# Patient Record
Sex: MALE | Race: WHITE | NOT HISPANIC OR LATINO | Employment: FULL TIME | URBAN - METROPOLITAN AREA
[De-identification: names, ages, dates, MRNs, and addresses within clinical notes are randomized per-mention and may not be internally consistent; named-entity substitution may affect disease eponyms.]

---

## 2022-06-01 ENCOUNTER — OFFICE VISIT (OUTPATIENT)
Dept: URGENT CARE | Facility: URGENT CARE | Age: 37
End: 2022-06-01
Payer: COMMERCIAL

## 2022-06-01 VITALS
TEMPERATURE: 97.9 F | SYSTOLIC BLOOD PRESSURE: 126 MMHG | RESPIRATION RATE: 18 BRPM | OXYGEN SATURATION: 99 % | WEIGHT: 215 LBS | HEART RATE: 84 BPM | DIASTOLIC BLOOD PRESSURE: 82 MMHG
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
## 2022-06-01 DIAGNOSIS — S61.310A LACERATION OF RIGHT INDEX FINGER WITHOUT FOREIGN BODY WITH DAMAGE TO NAIL, INITIAL ENCOUNTER: ICD-10-CM

## 2022-06-01 DIAGNOSIS — S61.258A DOG BITE OF INDEX FINGER, INITIAL ENCOUNTER: Primary | ICD-10-CM

## 2022-06-01 PROCEDURE — 90471 IMMUNIZATION ADMIN: CPT | Performed by: PHYSICIAN ASSISTANT

## 2022-06-01 PROCEDURE — 99203 OFFICE O/P NEW LOW 30 MIN: CPT | Mod: 25 | Performed by: PHYSICIAN ASSISTANT

## 2022-06-01 PROCEDURE — 90715 TDAP VACCINE 7 YRS/> IM: CPT | Performed by: PHYSICIAN ASSISTANT

## 2022-06-01 NOTE — PATIENT INSTRUCTIONS
Monitor for evidence of local infection, if redness expands after 48 hours of antibiotics, the medicine is not appropriate for the infection    Scarring will occur with this kind of injury.  The lateral fold of your nail will not return to its previous normal    You must confirm rabies vaccination of the dog, and monitor for 5 days for abnormal behavior or death.

## 2022-06-02 NOTE — PROGRESS NOTES
Prior to immunization administration, verified patients identity using patient s name and date of birth. Please see Immunization Activity for additional information.     Screening Questionnaire for Adult Immunization    Are you sick today?   No   Do you have allergies to medications, food, a vaccine component or latex?   No   Have you ever had a serious reaction after receiving a vaccination?   No   Do you have a long-term health problem with heart, lung, kidney, or metabolic disease (e.g., diabetes), asthma, a blood disorder, no spleen, complement component deficiency, a cochlear implant, or a spinal fluid leak?  Are you on long-term aspirin therapy?   No   Do you have cancer, leukemia, HIV/AIDS, or any other immune system problem?   No   Do you have a parent, brother, or sister with an immune system problem?   No   In the past 3 months, have you taken medications that affect  your immune system, such as prednisone, other steroids, or anticancer drugs; drugs for the treatment of rheumatoid arthritis, Crohn s disease, or psoriasis; or have you had radiation treatments?   No   Have you had a seizure, or a brain or other nervous system problem?   No   During the past year, have you received a transfusion of blood or blood    products, or been given immune (gamma) globulin or antiviral drug?   No   For women: Are you pregnant or is there a chance you could become       pregnant during the next month?   No   Have you received any vaccinations in the past 4 weeks?   No     Immunization questionnaire answers were all negative.        Per orders of Shaheen GARCIA, injection of TDAP given by Richy Mc CMA. Patient instructed to remain in clinic for 15 minutes afterwards, and to report any adverse reaction to me immediately.       Screening performed by Richy Mc CMA on 6/1/2022 at 8:19 PM.

## 2022-06-07 NOTE — PROGRESS NOTES
SUBJECTIVE:  Domo Simms is a 37 year old male who presents with a chief complaint of an animal bite on the fingers.  He was bitten by a dog just prior to arrival.   Cicumstances of bite: unprovoked attack.  Severity: moderate.  Animal's immunizations up to date per owner   Associated symptoms: deformity was immediately noted, drainage from wound site, swelling and edema at site    Td vaccination indicated and given today    No past medical history on file.    Current Outpatient Medications:      amoxicillin-clavulanate (AUGMENTIN) 875-125 MG tablet, Take 1 tablet by mouth 2 times daily for 7 days, Disp: 14 tablet, Rfl: 0     amoxicillin-clavulanate (AUGMENTIN) 875-125 MG tablet, Take 1 tablet by mouth 2 times daily for 7 days, Disp: 14 tablet, Rfl: 0  Social History     Tobacco Use     Smoking status: Never Smoker     Smokeless tobacco: Never Used   Substance Use Topics     Alcohol use: Not on file       ROS:  Review of systems negative except as stated above.    OBJECTIVE:  /82   Pulse 84   Temp 97.9  F (36.6  C)   Resp 18   Wt 97.5 kg (215 lb)   SpO2 99%   GENERAL: healthy, alert no acute distress  SKIN: obliteration of lateral fold right index finger  RESP: lungs clear to auscultation - no rales, rhonchi or wheezes  CV: regular rates and rhythm, normal S1 S2, no murmer noted  MS:extremities normal- no gross deformities noted,  FROM noted in all extremities  NEURO: Normal strength and tone, sensory exam grossly normal,  normal speech and mentation    ASSESSMENT:  (S61.258A,  W54.0XXA) Dog bite of index finger, initial encounter  (primary encounter diagnosis)  Plan: TDAP VACCINE (Adacel, Boostrix)  [[id number]],         amoxicillin-clavulanate (AUGMENTIN) 875-125 MG         tablet, amoxicillin-clavulanate (AUGMENTIN)         875-125 MG tablet, INJECTION INTRAMUSCULAR OR         SUB-Q      (S61.310A) Laceration of right index finger without foreign body with damage to nail, initial encounter  Plan:  TDAP VACCINE (Adacel, Boostrix)  [[id number]],         amoxicillin-clavulanate (AUGMENTIN) 875-125 MG         tablet, INJECTION INTRAMUSCULAR OR SUB-Q      Patient Instructions   Monitor for evidence of local infection, if redness expands after 48 hours of antibiotics, the medicine is not appropriate for the infection    Scarring will occur with this kind of injury.  The lateral fold of your nail will not return to its previous normal    You must confirm rabies vaccination of the dog, and monitor for 5 days for abnormal behavior or death.

## 2022-08-21 ENCOUNTER — HEALTH MAINTENANCE LETTER (OUTPATIENT)
Age: 37
End: 2022-08-21

## 2022-11-21 ENCOUNTER — HEALTH MAINTENANCE LETTER (OUTPATIENT)
Age: 37
End: 2022-11-21

## 2023-02-01 ENCOUNTER — APPOINTMENT (OUTPATIENT)
Dept: CT IMAGING | Facility: CLINIC | Age: 38
DRG: 101 | End: 2023-02-01
Attending: EMERGENCY MEDICINE
Payer: COMMERCIAL

## 2023-02-01 ENCOUNTER — HOSPITAL ENCOUNTER (INPATIENT)
Facility: CLINIC | Age: 38
LOS: 3 days | Discharge: HOME | DRG: 101 | End: 2023-02-04
Attending: EMERGENCY MEDICINE | Admitting: INTERNAL MEDICINE
Payer: COMMERCIAL

## 2023-02-01 DIAGNOSIS — N17.9 ACUTE KIDNEY INJURY (H): ICD-10-CM

## 2023-02-01 DIAGNOSIS — G40.909 RECURRENT SEIZURES (H): ICD-10-CM

## 2023-02-01 LAB
ALBUMIN SERPL BCG-MCNC: 5 G/DL (ref 3.5–5.2)
ALBUMIN UR-MCNC: NEGATIVE MG/DL
ALP SERPL-CCNC: 86 U/L (ref 40–129)
ALT SERPL W P-5'-P-CCNC: 28 U/L (ref 10–50)
AMPHETAMINES UR QL SCN: ABNORMAL
ANION GAP SERPL CALCULATED.3IONS-SCNC: 38 MMOL/L (ref 7–15)
APPEARANCE UR: CLEAR
AST SERPL W P-5'-P-CCNC: 27 U/L (ref 10–50)
ATRIAL RATE - MUSE: 91 BPM
BARBITURATES UR QL SCN: ABNORMAL
BASOPHILS # BLD MANUAL: 0.2 10E3/UL (ref 0–0.2)
BASOPHILS NFR BLD MANUAL: 1 %
BENZODIAZ UR QL SCN: ABNORMAL
BILIRUB SERPL-MCNC: 0.3 MG/DL
BILIRUB UR QL STRIP: NEGATIVE
BUN SERPL-MCNC: 11.4 MG/DL (ref 6–20)
BZE UR QL SCN: ABNORMAL
CALCIUM SERPL-MCNC: 10.8 MG/DL (ref 8.6–10)
CANNABINOIDS UR QL SCN: ABNORMAL
CHLORIDE SERPL-SCNC: 101 MMOL/L (ref 98–107)
CK SERPL-CCNC: 677 U/L (ref 39–308)
COLOR UR AUTO: NORMAL
CREAT SERPL-MCNC: 1.4 MG/DL (ref 0.67–1.17)
DEPRECATED HCO3 PLAS-SCNC: 7 MMOL/L (ref 22–29)
DIASTOLIC BLOOD PRESSURE - MUSE: NORMAL MMHG
EOSINOPHIL # BLD MANUAL: 0 10E3/UL (ref 0–0.7)
EOSINOPHIL NFR BLD MANUAL: 0 %
ERYTHROCYTE [DISTWIDTH] IN BLOOD BY AUTOMATED COUNT: 12.7 % (ref 10–15)
ETHANOL SERPL-MCNC: <0.01 G/DL
GFR SERPL CREATININE-BSD FRML MDRD: 66 ML/MIN/1.73M2
GLUCOSE SERPL-MCNC: 239 MG/DL (ref 70–99)
GLUCOSE UR STRIP-MCNC: NEGATIVE MG/DL
HCT VFR BLD AUTO: 56 % (ref 40–53)
HGB BLD-MCNC: 16.9 G/DL (ref 13.3–17.7)
HGB UR QL STRIP: NEGATIVE
HOLD SPECIMEN: NORMAL
INTERPRETATION ECG - MUSE: NORMAL
KETONES UR STRIP-MCNC: NEGATIVE MG/DL
LACTATE SERPL-SCNC: 3.4 MMOL/L (ref 0.7–2)
LEUKOCYTE ESTERASE UR QL STRIP: NEGATIVE
LEVETIRACETAM SERPL-MCNC: 22.4 ΜG/ML (ref 10–40)
LYMPHOCYTES # BLD MANUAL: 8.8 10E3/UL (ref 0.8–5.3)
LYMPHOCYTES NFR BLD MANUAL: 52 %
MCH RBC QN AUTO: 29.9 PG (ref 26.5–33)
MCHC RBC AUTO-ENTMCNC: 30.2 G/DL (ref 31.5–36.5)
MCV RBC AUTO: 99 FL (ref 78–100)
MONOCYTES # BLD MANUAL: 1 10E3/UL (ref 0–1.3)
MONOCYTES NFR BLD MANUAL: 6 %
NEUTROPHILS # BLD MANUAL: 6.9 10E3/UL (ref 1.6–8.3)
NEUTROPHILS NFR BLD MANUAL: 41 %
NITRATE UR QL: NEGATIVE
OPIATES UR QL SCN: ABNORMAL
P AXIS - MUSE: 72 DEGREES
PCP QUAL URINE (ROCHE): ABNORMAL
PH UR STRIP: 5 [PH] (ref 5–7)
PLAT MORPH BLD: ABNORMAL
PLATELET # BLD AUTO: 342 10E3/UL (ref 150–450)
POTASSIUM SERPL-SCNC: 3.8 MMOL/L (ref 3.4–5.3)
PR INTERVAL - MUSE: 130 MS
PROT SERPL-MCNC: 8.4 G/DL (ref 6.4–8.3)
QRS DURATION - MUSE: 122 MS
QT - MUSE: 398 MS
QTC - MUSE: 489 MS
R AXIS - MUSE: 69 DEGREES
RBC # BLD AUTO: 5.65 10E6/UL (ref 4.4–5.9)
RBC MORPH BLD: ABNORMAL
SODIUM SERPL-SCNC: 146 MMOL/L (ref 136–145)
SP GR UR STRIP: 1.01 (ref 1–1.03)
SYSTOLIC BLOOD PRESSURE - MUSE: NORMAL MMHG
T AXIS - MUSE: 64 DEGREES
UROBILINOGEN UR STRIP-MCNC: NORMAL MG/DL
VENTRICULAR RATE- MUSE: 91 BPM
WBC # BLD AUTO: 16.9 10E3/UL (ref 4–11)

## 2023-02-01 PROCEDURE — 120N000001 HC R&B MED SURG/OB

## 2023-02-01 PROCEDURE — 83605 ASSAY OF LACTIC ACID: CPT | Performed by: INTERNAL MEDICINE

## 2023-02-01 PROCEDURE — 250N000011 HC RX IP 250 OP 636: Performed by: EMERGENCY MEDICINE

## 2023-02-01 PROCEDURE — 99285 EMERGENCY DEPT VISIT HI MDM: CPT | Mod: 25

## 2023-02-01 PROCEDURE — 258N000003 HC RX IP 258 OP 636: Performed by: INTERNAL MEDICINE

## 2023-02-01 PROCEDURE — 80177 DRUG SCRN QUAN LEVETIRACETAM: CPT | Performed by: EMERGENCY MEDICINE

## 2023-02-01 PROCEDURE — 85007 BL SMEAR W/DIFF WBC COUNT: CPT | Performed by: EMERGENCY MEDICINE

## 2023-02-01 PROCEDURE — 93005 ELECTROCARDIOGRAM TRACING: CPT

## 2023-02-01 PROCEDURE — 81003 URINALYSIS AUTO W/O SCOPE: CPT | Performed by: INTERNAL MEDICINE

## 2023-02-01 PROCEDURE — 36415 COLL VENOUS BLD VENIPUNCTURE: CPT | Performed by: INTERNAL MEDICINE

## 2023-02-01 PROCEDURE — 85027 COMPLETE CBC AUTOMATED: CPT | Performed by: EMERGENCY MEDICINE

## 2023-02-01 PROCEDURE — 250N000013 HC RX MED GY IP 250 OP 250 PS 637: Performed by: INTERNAL MEDICINE

## 2023-02-01 PROCEDURE — 96365 THER/PROPH/DIAG IV INF INIT: CPT

## 2023-02-01 PROCEDURE — 80307 DRUG TEST PRSMV CHEM ANLYZR: CPT | Performed by: INTERNAL MEDICINE

## 2023-02-01 PROCEDURE — 96375 TX/PRO/DX INJ NEW DRUG ADDON: CPT

## 2023-02-01 PROCEDURE — 258N000003 HC RX IP 258 OP 636: Performed by: EMERGENCY MEDICINE

## 2023-02-01 PROCEDURE — 82550 ASSAY OF CK (CPK): CPT | Performed by: INTERNAL MEDICINE

## 2023-02-01 PROCEDURE — 36415 COLL VENOUS BLD VENIPUNCTURE: CPT | Performed by: EMERGENCY MEDICINE

## 2023-02-01 PROCEDURE — 80053 COMPREHEN METABOLIC PANEL: CPT | Performed by: EMERGENCY MEDICINE

## 2023-02-01 PROCEDURE — 82077 ASSAY SPEC XCP UR&BREATH IA: CPT | Performed by: EMERGENCY MEDICINE

## 2023-02-01 PROCEDURE — 99223 1ST HOSP IP/OBS HIGH 75: CPT | Mod: AI | Performed by: INTERNAL MEDICINE

## 2023-02-01 PROCEDURE — 70450 CT HEAD/BRAIN W/O DYE: CPT

## 2023-02-01 RX ORDER — OMEGA-3 FATTY ACIDS/FISH OIL 300-1000MG
200 CAPSULE ORAL EVERY 6 HOURS PRN
COMMUNITY

## 2023-02-01 RX ORDER — DEXTROAMPHETAMINE SACCHARATE, AMPHETAMINE ASPARTATE, DEXTROAMPHETAMINE SULFATE AND AMPHETAMINE SULFATE 2.5; 2.5; 2.5; 2.5 MG/1; MG/1; MG/1; MG/1
10 TABLET ORAL 3 TIMES DAILY
Status: DISCONTINUED | OUTPATIENT
Start: 2023-02-01 | End: 2023-02-04 | Stop reason: HOSPADM

## 2023-02-01 RX ORDER — LEVETIRACETAM 750 MG/1
1500 TABLET ORAL 2 TIMES DAILY
Status: DISCONTINUED | OUTPATIENT
Start: 2023-02-01 | End: 2023-02-02

## 2023-02-01 RX ORDER — ONDANSETRON 2 MG/ML
4 INJECTION INTRAMUSCULAR; INTRAVENOUS EVERY 6 HOURS PRN
Status: DISCONTINUED | OUTPATIENT
Start: 2023-02-01 | End: 2023-02-04 | Stop reason: HOSPADM

## 2023-02-01 RX ORDER — LEVETIRACETAM 750 MG/1
1500 TABLET ORAL 2 TIMES DAILY
Status: ON HOLD | COMMUNITY
End: 2023-02-04

## 2023-02-01 RX ORDER — ACETAMINOPHEN 650 MG/1
650 SUPPOSITORY RECTAL EVERY 6 HOURS PRN
Status: DISCONTINUED | OUTPATIENT
Start: 2023-02-01 | End: 2023-02-04 | Stop reason: HOSPADM

## 2023-02-01 RX ORDER — LORAZEPAM 2 MG/ML
INJECTION INTRAMUSCULAR
Status: DISCONTINUED
Start: 2023-02-01 | End: 2023-02-01

## 2023-02-01 RX ORDER — LEVOTHYROXINE SODIUM 137 UG/1
137 TABLET ORAL DAILY
COMMUNITY

## 2023-02-01 RX ORDER — SODIUM CHLORIDE, SODIUM LACTATE, POTASSIUM CHLORIDE, CALCIUM CHLORIDE 600; 310; 30; 20 MG/100ML; MG/100ML; MG/100ML; MG/100ML
INJECTION, SOLUTION INTRAVENOUS CONTINUOUS
Status: DISCONTINUED | OUTPATIENT
Start: 2023-02-01 | End: 2023-02-02

## 2023-02-01 RX ORDER — CHOLECALCIFEROL (VITAMIN D3) 10(400)/ML
DROPS ORAL DAILY
COMMUNITY

## 2023-02-01 RX ORDER — LEVOTHYROXINE SODIUM 137 UG/1
137 TABLET ORAL
Status: DISCONTINUED | OUTPATIENT
Start: 2023-02-01 | End: 2023-02-04 | Stop reason: HOSPADM

## 2023-02-01 RX ORDER — ONDANSETRON 4 MG/1
4 TABLET, ORALLY DISINTEGRATING ORAL EVERY 6 HOURS PRN
Status: DISCONTINUED | OUTPATIENT
Start: 2023-02-01 | End: 2023-02-04 | Stop reason: HOSPADM

## 2023-02-01 RX ORDER — LORAZEPAM 2 MG/ML
2 INJECTION INTRAMUSCULAR EVERY 4 HOURS PRN
Status: DISCONTINUED | OUTPATIENT
Start: 2023-02-01 | End: 2023-02-04 | Stop reason: HOSPADM

## 2023-02-01 RX ORDER — DEXTROAMPHETAMINE SACCHARATE, AMPHETAMINE ASPARTATE, DEXTROAMPHETAMINE SULFATE AND AMPHETAMINE SULFATE 2.5; 2.5; 2.5; 2.5 MG/1; MG/1; MG/1; MG/1
10 TABLET ORAL 3 TIMES DAILY
COMMUNITY

## 2023-02-01 RX ORDER — LEVETIRACETAM 10 MG/ML
1000 INJECTION INTRAVASCULAR
Status: COMPLETED | OUTPATIENT
Start: 2023-02-01 | End: 2023-02-01

## 2023-02-01 RX ORDER — LIDOCAINE 40 MG/G
CREAM TOPICAL
Status: DISCONTINUED | OUTPATIENT
Start: 2023-02-01 | End: 2023-02-04 | Stop reason: HOSPADM

## 2023-02-01 RX ORDER — CALCIUM POLYCARBOPHIL 625 MG
TABLET ORAL DAILY
COMMUNITY

## 2023-02-01 RX ORDER — LORAZEPAM 2 MG/ML
2 INJECTION INTRAMUSCULAR ONCE
Status: COMPLETED | OUTPATIENT
Start: 2023-02-01 | End: 2023-02-01

## 2023-02-01 RX ORDER — ACETAMINOPHEN 325 MG/1
650 TABLET ORAL EVERY 6 HOURS PRN
Status: DISCONTINUED | OUTPATIENT
Start: 2023-02-01 | End: 2023-02-04 | Stop reason: HOSPADM

## 2023-02-01 RX ADMIN — DEXTROAMPHETAMINE SACCHARATE, AMPHETAMINE ASPARTATE, DEXTROAMPHETAMINE SULFATE AND AMPHETAMINE SULFATE 10 MG: 2.5; 2.5; 2.5; 2.5 TABLET ORAL at 13:42

## 2023-02-01 RX ADMIN — ACETAMINOPHEN 650 MG: 325 TABLET, FILM COATED ORAL at 12:44

## 2023-02-01 RX ADMIN — LEVOTHYROXINE SODIUM 137 MCG: 137 TABLET ORAL at 12:44

## 2023-02-01 RX ADMIN — SODIUM CHLORIDE 1000 ML: 9 INJECTION, SOLUTION INTRAVENOUS at 10:01

## 2023-02-01 RX ADMIN — LORAZEPAM 2 MG: 2 INJECTION INTRAMUSCULAR; INTRAVENOUS at 08:53

## 2023-02-01 RX ADMIN — SODIUM CHLORIDE, POTASSIUM CHLORIDE, SODIUM LACTATE AND CALCIUM CHLORIDE: 600; 310; 30; 20 INJECTION, SOLUTION INTRAVENOUS at 12:43

## 2023-02-01 RX ADMIN — LEVETIRACETAM 1500 MG: 750 TABLET, FILM COATED ORAL at 20:10

## 2023-02-01 RX ADMIN — SODIUM CHLORIDE, POTASSIUM CHLORIDE, SODIUM LACTATE AND CALCIUM CHLORIDE: 600; 310; 30; 20 INJECTION, SOLUTION INTRAVENOUS at 22:34

## 2023-02-01 RX ADMIN — LEVETIRACETAM 1000 MG: 10 INJECTION INTRAVENOUS at 08:53

## 2023-02-01 RX ADMIN — ACETAMINOPHEN 650 MG: 325 TABLET, FILM COATED ORAL at 18:47

## 2023-02-01 RX ADMIN — LEVETIRACETAM 1000 MG: 10 INJECTION INTRAVENOUS at 09:05

## 2023-02-01 ASSESSMENT — ACTIVITIES OF DAILY LIVING (ADL)
CHANGE_IN_FUNCTIONAL_STATUS_SINCE_ONSET_OF_CURRENT_ILLNESS/INJURY: NO
ADLS_ACUITY_SCORE: 41
VISION_MANAGEMENT: GLASSES
CONCENTRATING,_REMEMBERING_OR_MAKING_DECISIONS_DIFFICULTY: NO
DRESSING/BATHING_DIFFICULTY: NO
DIFFICULTY_COMMUNICATING: NO
ADLS_ACUITY_SCORE: 26
ADLS_ACUITY_SCORE: 26
HEARING_DIFFICULTY_OR_DEAF: NO
DIFFICULTY_EATING/SWALLOWING: NO
ADLS_ACUITY_SCORE: 26
ADLS_ACUITY_SCORE: 26
DOING_ERRANDS_INDEPENDENTLY_DIFFICULTY: NO
TOILETING_ISSUES: NO
ADLS_ACUITY_SCORE: 39
ADLS_ACUITY_SCORE: 35
FALL_HISTORY_WITHIN_LAST_SIX_MONTHS: NO
WALKING_OR_CLIMBING_STAIRS_DIFFICULTY: NO
ADLS_ACUITY_SCORE: 35
WEAR_GLASSES_OR_BLIND: YES

## 2023-02-01 NOTE — H&P
[facility]    History and Physical - Hospitalist Service       Date of Admission:  2/1/2023    Assessment & Plan      Domo Simms is a 37 year old male admitted on 2/1/2023. He has hx of epilepsy dating back to childhood usually nocturnal seizures.  He had increased etoh intake over the weekend, possibly last 2 nights.  He was altered last night and this morning was not acting normal per wife.  He then had 3 episodes of generalized tonic-clonic seizures x3 at home and 1 episode en route via EMS to [facility] ED.    1. Recurrent Seizures  Hx of epilepsy  -- admit to inpatient   -- CTA nothing acute  -- seizure precautions  -- loaded with keppra, continue home dosing   -- neurology consultation  --  keppra level 22.4 is therapeutic  -- viscous lidocaine for oral trauma    2. NAG acidosis  -- likely secondary to lactic acidosis from multiple seizure episodes  -- LR IVF  -- check LA, CK  -- follow BMP    3. SANJUANITA  Hypernatremia  Hypercalemia  -- Cr 1.4, no prior hx of CKD  -- IVF  -- monitor I/O  -- follow Cr  -- check UA and Utox screen  -- repeat Ca after IVF    4. Leukocytosis  -- possibly stress related  -- will check CXR  -- no fever    5. ADD  -- hold adderall    6. Hx of HTN  -- not on any medications  -- monitor    Diet: Combination Diet Clear Liquid  Can advance as tolerated  DVT Prophylaxis: Pneumatic Compression Devices  Motta Catheter: Not present  Lines: None     Cardiac Monitoring: ACTIVE order. Indication: seizure  Code Status: Full Code      Clinically Significant Risk Factors Present on Admission         # Hypernatremia: Highest Na = 146 mmol/L in last 2 days, will monitor as appropriate   # Hypercalcemia: Highest Ca = 10.8 mg/dL in last 2 days, will monitor as appropriate   # Anion Gap Metabolic Acidosis: Highest Anion Gap = 38 mmol/L in last 2 days, will monitor and treat as appropriate                   Disposition Plan      Expected Discharge Date: 02/03/2023                   Garrison Hall MD  Hospitalist Service  [facility]  Securely message with [employer] (more info)  Text page via [facility] Paging/Directory     ______________________________________________________________________    Chief Complaint      Multiple seizures    History is obtained from the patient and patient's spouse and ER MD    History of Present Illness   Domo Simms is a 37 year old male who has hx of childhood epilepsy and on chronic antiepileptic medications presents to [facility] via EMS after multiple seizure episodes.  Patient is somnolent but not confused.  His wife states that his seizures are usually nocturnal and his last seizure episode was about 8 months ago.  He follow outpatient neurology and is compliant with his medications although he missed Monday evening medication.      The patient has had increased stress at work.  His wife and he partied and were drinking heavily on Saturday and Sunday.  He had some alcohol in the evenings on Monday and Tuesday night but wife unable to qualify.  His wife said he was acting odd last night before going to be.  This morning the patient was not quite himself.  Wife took him along with their young son to drop off at .  He had multiple generalized tonic-clonic seizure episodes in the car (x3) about 15 minutes apart.  He was driven back home.  EMS was contacted and he proceeded to have his 4th seizure en route to the hospital.  He has number bite marks to his tone, and inner lip.       He was stabilized with iv ativan and loaded with 2 grams of IV keppra.  He is somnolent but arouses and is appropriate.  His wife states he usually has nocturnal seizures that average about once a year.  They deny any other drugs, but the patient does smoke marijuana.      Past Medical History    1. Epilepsy  2. ADD recent dx  3. HTN, not on any medications    Past Surgical History   No past surgical history on file.    Prior to Admission Medications    Prior to Admission Medications   Prescriptions Last Dose Informant Patient Reported? Taking?   Calcium Polycarbophil (FIBER) 625 MG tablet 1/31/2023 at AM Spouse/Significant Other Yes Yes   Sig: Take by mouth daily   Cholecalciferol (VITAMIN D3) 10 MCG/ML LIQD 1/31/2023 at AM Spouse/Significant Other Yes Yes   Sig: Take by mouth daily   amphetamine-dextroamphetamine (ADDERALL) 10 MG tablet 1/31/2023 at 1400 Spouse/Significant Other Yes Yes   Sig: Take 10 mg by mouth 3 times daily   ibuprofen (ADVIL/MOTRIN) 200 MG capsule 1/31/2023 at PM Spouse/Significant Other Yes Yes   Sig: Take 200 mg by mouth every 6 hours as needed for fever   levETIRAcetam (KEPPRA) 750 MG tablet 1/31/2023 at PM Spouse/Significant Other Yes Yes   Sig: Take 1,500 mg by mouth 2 times daily   levothyroxine (SYNTHROID/LEVOTHROID) 137 MCG tablet 1/31/2023 at AM Spouse/Significant Other Yes Yes   Sig: Take 137 mcg by mouth daily      Facility-Administered Medications: None        Review of Systems    Review of systems not obtained due to patient factors - mental status     Physical Exam   Vital Signs: Temp: 98  F (36.7  C) Temp src: Axillary BP: 129/76 Pulse: 66   Resp: 16 SpO2: 98 % O2 Device: None (Room air)    Weight: 0 lbs 0 oz    General Appearance: Somnolent, arouses  HEENT:  NCAT, numerous tongue and inner lip trauma, Pupils equal  Respiratory: CTA  Cardiovascular: RRR  GI: soft +BS  Skin: warm dry  Other: Moves all 4 ext, CN intact, coordination and strength and sensation intact    Medical Decision Making     60 MINUTES SPENT BY ME on the date of service doing chart review, history, exam, documentation & further activities per the note.      Data     I have personally reviewed the following data over the past 24 hrs:    16.9 (H)  \   16.9   / 342     146 (H) 101 11.4 /  239 (H)   3.8 7 (LL) 1.40 (H) \       ALT: 28 AST: 27 AP: 86 TBILI: 0.3   ALB: 5.0 TOT PROTEIN: 8.4 (H) LIPASE: N/A

## 2023-02-01 NOTE — CONSULTS
DATE OF SERVICE : 2/1/2023    DATE OF ADMISSION: 2/1/2023    NEUROLOGICAL CONSULTATION    REQUESTED BY Dr. Hall, Garrison Mora MD    SOURCE OF INFORMATION:Patient and EHR    REASON FOR CONSULTATION:   Recurrent seizures  HISTORY OF PRESENT ILLNESS:   Collateral history per wife patient is somnolent s/p multiple seizures  Known history of seizure disorder since childhood mostly seizures were nocturnal prior to arrival Keppra every couple years patient had breakthrough seizures .  He had a heavy drinking over the weekend , reportedly missed his medication on Monday.  This morning he was acting confused had a breakthrough seizure multiple events for tonic-clonic seizures along with tongue biting.  He has not taken his morning medications.  He did receive Keppra load 1000 mg.  Currently postictal.  Keppra levels are pending    Medications Prior to Admission   Medication Sig Dispense Refill Last Dose     amphetamine-dextroamphetamine (ADDERALL) 10 MG tablet Take 10 mg by mouth 3 times daily   1/31/2023 at 1400     Calcium Polycarbophil (FIBER) 625 MG tablet Take by mouth daily   1/31/2023 at AM     Cholecalciferol (VITAMIN D3) 10 MCG/ML LIQD Take by mouth daily   1/31/2023 at AM     ibuprofen (ADVIL/MOTRIN) 200 MG capsule Take 200 mg by mouth every 6 hours as needed for fever   1/31/2023 at PM     levETIRAcetam (KEPPRA) 750 MG tablet Take 1,500 mg by mouth 2 times daily   1/31/2023 at PM     levothyroxine (SYNTHROID/LEVOTHROID) 137 MCG tablet Take 137 mcg by mouth daily   1/31/2023 at AM     Current Facility-Administered Medications   Medication Dose Route Frequency     amphetamine-dextroamphetamine  10 mg Oral TID     levETIRAcetam  1,500 mg Oral BID     levothyroxine  137 mcg Oral QAM AC     sodium chloride (PF)  3 mL Intracatheter Q8H     Current Facility-Administered Medications   Medication Dose Route Frequency     acetaminophen  650 mg Oral Q6H PRN    Or     acetaminophen  650 mg Rectal Q6H PRN     lidocaine 4%    Topical Q1H PRN     lidocaine (buffered or not buffered)  0.1-1 mL Other Q1H PRN     LORazepam  2 mg Intravenous Q4H PRN     melatonin  1 mg Oral At Bedtime PRN     ondansetron  4 mg Oral Q6H PRN    Or     ondansetron  4 mg Intravenous Q6H PRN     sodium chloride (PF)  3 mL Intracatheter q1 min prn    No Known Allergies    SocHx:  reports that he has never smoked. He has never used smokeless tobacco.    ROS: Refer to H&P    PHYSICAL EXAM  /74 (BP Location: Right arm)   Pulse 80   Temp 98.2  F (36.8  C) (Oral)   Resp 16   SpO2 96%     Patient is lethargic arousable able to follow simple commands no labored breathing  Able to follow simple commands  Pupils equal and round no gaze preference face is symmetric hearing normal to conversation tongue is in midline laceration the tip of the tongue and right lateral aspect of the tongue  Able to move all 4 limbs against reflexes normal and symmetrical no tumors or involuntary movements.  Lab and X-ray:   Recent Labs   Lab Test 02/01/23  0848   WBC 16.9*   HGB 16.9      POTASSIUM 3.8     Recent Labs   Lab Test 02/01/23  0848   POTASSIUM 3.8   CHLORIDE 101   BUN 11.4     Recent Labs   Lab Test 02/01/23  0848   WBC 16.9*   HGB 16.9   MCV 99        Recent Labs   Lab Test 02/01/23  0848   AST 27   ALT 28   ALKPHOS 86     No lab results found.    Invalid input(s): TRIGLYCERIDES  No lab results found.    Laboratory results were personally interpreted and reviewed in detail.  Imaging studies reviewed and interpreted in detail      Summary: List Problems:   Patient Active Problem List   Diagnosis     Recurrent seizures (H)       ASSESSMENT:   Domo Simms presented with recurrent breakthrough seizures likely in the setting of medication noncompliance     He has received IV Keppra load 1000 mg    Continue on the home regimen of Keppra 1500 mg twice daily    Keppra levels are pending    Neurochecks per protocol    Call us with any worsening or new neuro  changes    Thank you for the opportunity to provide consultation on Domo Simms

## 2023-02-01 NOTE — ED PROVIDER NOTES
History     Chief Complaint:  Seizures       HPI   Domo Simms is a 37 year old male with a history of a seizure disorder who presents with recurrent seizures.  Patient has had longstanding seizure disorder with very infrequent seizures.  He is on 1500 mg Keppra twice daily and family endorses compliance.  Patient had been drinking alcohol heavily over the weekend as well as several drinks a day for the last 2 days with last alcoholic beverage being last night.  Family reports that he does not typically drink heavily.  He was noted to be confused this morning and subsequently had 2 witnessed generalized seizures with postictal phase in between; seizures lasted less than 1 minute and self aborted.  Family drove the patient into the emergency department and he had another seizure on route.  Patient was moved into the stabilization room where he had a fourth witnessed seizure episode.  He is now postictal and unable provide history.  No history of recent fever.  Family does note that he was feeling unwell and stressed over the last few days.      Independent Historian:   History provided by patient's wife and mother    ROS:  Review of Systems   Unable to perform ROS: Patient unresponsive     Allergies:  No Known Allergies     Medications:    Keppra  Levothyroxine  Adderall    Past Medical History:    Seizure disorder  Acquired hypothyroidism    Past Surgical History:    Appendectomy    Family History:    Noncontributory    Social History:  Drinks alcohol occasionally per family  PCP: No Ref-Primary, Physician     Physical Exam     Patient Vitals for the past 24 hrs:   BP Temp Temp src Pulse Resp SpO2   02/01/23 1032 (!) 122/90 -- -- 78 15 96 %   02/01/23 1021 -- 97.4  F (36.3  C) Temporal 79 15 95 %   02/01/23 1000 127/80 -- -- 90 14 94 %   02/01/23 0952 -- -- -- 92 27 94 %   02/01/23 0942 127/84 -- -- -- -- --   02/01/23 0847 (!) 169/83 -- -- 88 22 97 %   02/01/23 0844 -- -- -- 91 16 98 %        Physical  Exam  General: Initially with generalized seizure, then postictal.  Patient in moderate distress.  Unable to cooperate with exam.  Head:  Scalp is NC/AT  Eyes:  No scleral icterus, PERRL  ENT:  The external nose and ears are normal. The oropharynx is normal and without erythema; mucus membranes are moist.   Neck:  Normal range of motion without rigidity.  CV:  Regular rate and rhythm    No pathologic murmur   Resp:  Breath sounds are clear bilaterally    Non-labored, no retractions or accessory muscle use  GI:  Abdomen is soft, no distension, no tenderness. No peritoneal signs  MS:  No lower extremity edema   Skin:  Warm and dry, No rash or lesions noted.  Neuro: Moving all extremities spontaneously      Emergency Department Course     Imaging:  Head CT w/o contrast   Final Result   IMPRESSION:   No evidence of acute intracranial hemorrhage, mass, or   herniation.         KRISTY FERNANDEZ MD            SYSTEM ID:  [id number]         Report per radiology    Laboratory:  Labs Ordered and Resulted from Time of ED Arrival to Time of ED Departure   COMPREHENSIVE METABOLIC PANEL - Abnormal       Result Value    Sodium 146 (*)     Potassium 3.8      Chloride 101      Carbon Dioxide (CO2) 7 (*)     Anion Gap 38 (*)     Urea Nitrogen 11.4      Creatinine 1.40 (*)     Calcium 10.8 (*)     Glucose 239 (*)     Alkaline Phosphatase 86      AST 27      ALT 28      Protein Total 8.4 (*)     Albumin 5.0      Bilirubin Total 0.3      GFR Estimate 66     CBC WITH PLATELETS AND DIFFERENTIAL - Abnormal    WBC Count 16.9 (*)     RBC Count 5.65      Hemoglobin 16.9      Hematocrit 56.0 (*)     MCV 99      MCH 29.9      MCHC 30.2 (*)     RDW 12.7      Platelet Count 342     DIFFERENTIAL - Abnormal    % Neutrophils 41      % Lymphocytes 52      % Monocytes 6      % Eosinophils 0      % Basophils 1      Absolute Neutrophils 6.9      Absolute Lymphocytes 8.8 (*)     Absolute Monocytes 1.0      Absolute Eosinophils 0.0      Absolute  Basophils 0.2      RBC Morphology Confirmed RBC Indices      Platelet Assessment        Value: Automated Count Confirmed. Platelet morphology is normal.   ETHYL ALCOHOL LEVEL - Normal    Alcohol ethyl <0.01     KEPPRA (LEVETIRACETAM) LEVEL        Emergency Department Course & Assessments:    Interventions:  Medications   LORazepam (ATIVAN) injection 2 mg (has no administration in time range)   LORazepam (ATIVAN) injection 2 mg (2 mg Intravenous Given 2/1/23 0853)   levETIRAcetam (KEPPRA) intermittent infusion 1,000 mg (0 mg Intravenous Stopped 2/1/23 1017)   0.9% sodium chloride BOLUS (1,000 mLs Intravenous New Bag 2/1/23 1001)        Disposition:  The patient was admitted to the hospital under the care of Dr. Hall.     Impression & Plan      Medical Decision Making:  Domo Simms is a 37 year old male with a PMH significant for a seizure disorder who presents for recent multiple seizures; seizing on ED arrival.  A broad differential diagnosis was considered for the seizure today including medicine non-compliance, low or high levels of anti-epileptic, intracranial bleed, stroke, tumor, hypoglycemia, cerebral edema, metabolic derangement, cardiac arrhythmia, etc.  The patient has no signs of concerning etiologies of seizure or seizure-mimics at this time.  Anti-epileptic levels were sent and are pending.  Family does note the patient has been drinking heavily for the last few days (family denies history of alcohol abuse); alcohol level is negative; patient with potential lower seizure threshold given recent alcohol use.  The head to toe trauma exam is negative.  Given recurrent seizures patient was provided 2 mg Ativan on arrival; seizure had spontaneously aborted prior to administration.  Additionally received 2 g load of Keppra.  No additional seizure activity had throughout ED course.  Patient remained postictal with improving mentation.  Head CT without significant acute findings.  Labs notable for low  bicarb and significant elevated ion gap and mildly elevated white count; likely secondary to seizure activity.  Given metabolic derangement in conjunction with recurrent seizures in the setting reported medication compliance, will admit to the hospitalist service for neurology consultation and further evaluation and care.    Diagnosis:    ICD-10-CM    1. Recurrent seizures (H)  G40.909       2. Acute kidney injury (H)  N17.9             Jose Ambrose,   02/01/23 1536

## 2023-02-01 NOTE — ED NOTES
[facility]  ED Nurse Handoff Report    ED Chief complaint: Seizures      ED Diagnosis:   Final diagnoses:   None       Code Status: MD to address     Allergies: No Known Allergies    Patient Story: Pt presents via family for seizure. Pt has hx of seizure disorder. Pt usually has seizures at night. Pt had one seizure in car on way, and one in ED. Drank more than usual this weekend.   Focused Assessment:  Lethargic, speech mumbled, alert to repeated stimulation.    Treatments and/or interventions provided:  IV Keppra, ativan, IV fluids, CT   Patient's response to treatments and/or interventions: In progress     To be done/followed up on inpatient unit:  n/a    Does this patient have any cognitive concerns?: Sx activity     Activity level - Baseline/Home:  Independent   Activity Level - Current:   Unknown    Patient's Preferred language: English   Needed?: No    Isolation: None  Infection: Not Applicable  Patient tested for COVID 19 prior to admission: NO  Bariatric?: No    Vital Signs:   Vitals:    02/01/23 0942 02/01/23 0952 02/01/23 1000 02/01/23 1021   BP: 127/84  127/80    Pulse:  92 90 79   Resp:  27 14 15   Temp:    97.4  F (36.3  C)   TempSrc:    Temporal   SpO2:  94% 94% 95%       Cardiac Rhythm: NSR     Was the PSS-3 completed:   No -unable pt is not answering questions at this time.   What interventions are required if any?               Family Comments: none  OBS brochure/video discussed/provided to patient/family: N/A                For the majority of the shift this patient's behavior was Green.   Behavioral interventions performed were n/a.    ED NURSE PHONE NUMBER: [phone]

## 2023-02-01 NOTE — PHARMACY-ADMISSION MEDICATION HISTORY
Pharmacy Medication History  Admission medication history interview status for the 2/1/2023  admission is complete. See EPIC admission navigator for prior to admission medications     Location of Interview: Patient room  Medication history sources: patient's wife and surescripts    Significant changes made to the medication list:  - added adderall, levothyroxine, levetiracetam, fiber pills, vit D, advil    In the past week, patient estimated taking medication this percent of the time: greater than 90%    Medication Affordability:       Additional medication history information:   N/A    Medication reconciliation completed by provider prior to medication history? No    Time spent in this activity: 10 minutes    Prior to Admission medications    Medication Sig Last Dose Taking? Auth Provider Long Term End Date   amphetamine-dextroamphetamine (ADDERALL) 10 MG tablet Take 10 mg by mouth 3 times daily 1/31/2023 at 1400 Yes Unknown, Entered By History     Calcium Polycarbophil (FIBER) 625 MG tablet Take by mouth daily 1/31/2023 at AM Yes Unknown, Entered By History     Cholecalciferol (VITAMIN D3) 10 MCG/ML LIQD Take by mouth daily 1/31/2023 at AM Yes Unknown, Entered By History     ibuprofen (ADVIL/MOTRIN) 200 MG capsule Take 200 mg by mouth every 6 hours as needed for fever 1/31/2023 at PM Yes Unknown, Entered By History     levETIRAcetam (KEPPRA) 750 MG tablet Take 1,500 mg by mouth 2 times daily 1/31/2023 at PM Yes Unknown, Entered By History Yes    levothyroxine (SYNTHROID/LEVOTHROID) 137 MCG tablet Take 137 mcg by mouth daily 1/31/2023 at AM Yes Unknown, Entered By History Yes        The information provided in this note is only as accurate as the sources available at the time of update(s)

## 2023-02-01 NOTE — PROGRESS NOTES
RECEIVING UNIT ED HANDOFF REVIEW    ED Nurse Handoff Report was reviewed by: Vivien Mane RN on February 1, 2023 at 11:21 AM

## 2023-02-01 NOTE — PLAN OF CARE
Pt here with seizures. Alert and oriented. Neuros intact, no deficits. VSS. Tele NSR. IDDSI level clear liquid diet with thin liquids. Takes pills whole. Up with SBA. Continent of bowel and bladder. Denies pain. Pt scoring green on the Aggression Stop Light Tool. Plan discharge home pending.

## 2023-02-01 NOTE — ED TRIAGE NOTES
Pt presents via family for seizure. Pt has hx of seizure disorder. Pt usually has seizures at night. Pt had one seizure in car on way, and one in ED.     Triage Assessment     Row Name 02/01/23 0845       Triage Assessment (Adult)    Airway WDL WDL       Cardiac WDL    Cardiac WDL WDL       Peripheral/Neurovascular WDL    Peripheral Neurovascular WDL WDL       Cognitive/Neuro/Behavioral WDL    Cognitive/Neuro/Behavioral WDL --  Seizure

## 2023-02-02 ENCOUNTER — HOSPITAL ENCOUNTER (INPATIENT)
Dept: NEUROLOGY | Facility: CLINIC | Age: 38
Discharge: HOME | DRG: 101 | End: 2023-02-02
Attending: PSYCHIATRY & NEUROLOGY
Payer: COMMERCIAL

## 2023-02-02 ENCOUNTER — APPOINTMENT (OUTPATIENT)
Dept: MRI IMAGING | Facility: CLINIC | Age: 38
DRG: 101 | End: 2023-02-02
Attending: PSYCHIATRY & NEUROLOGY
Payer: COMMERCIAL

## 2023-02-02 LAB
ALBUMIN SERPL BCG-MCNC: 3.9 G/DL (ref 3.5–5.2)
ALP SERPL-CCNC: 55 U/L (ref 40–129)
ALT SERPL W P-5'-P-CCNC: 22 U/L (ref 10–50)
ANION GAP SERPL CALCULATED.3IONS-SCNC: 9 MMOL/L (ref 7–15)
AST SERPL W P-5'-P-CCNC: 33 U/L (ref 10–50)
BILIRUB SERPL-MCNC: 0.5 MG/DL
BUN SERPL-MCNC: 6.4 MG/DL (ref 6–20)
CALCIUM SERPL-MCNC: 9.2 MG/DL (ref 8.6–10)
CHLORIDE SERPL-SCNC: 107 MMOL/L (ref 98–107)
CREAT SERPL-MCNC: 1.09 MG/DL (ref 0.67–1.17)
DEPRECATED HCO3 PLAS-SCNC: 25 MMOL/L (ref 22–29)
ERYTHROCYTE [DISTWIDTH] IN BLOOD BY AUTOMATED COUNT: 12.9 % (ref 10–15)
GFR SERPL CREATININE-BSD FRML MDRD: 90 ML/MIN/1.73M2
GLUCOSE SERPL-MCNC: 89 MG/DL (ref 70–99)
HCT VFR BLD AUTO: 44.4 % (ref 40–53)
HGB BLD-MCNC: 15 G/DL (ref 13.3–17.7)
MCH RBC QN AUTO: 30.2 PG (ref 26.5–33)
MCHC RBC AUTO-ENTMCNC: 33.8 G/DL (ref 31.5–36.5)
MCV RBC AUTO: 89 FL (ref 78–100)
PLAT MORPH BLD: NORMAL
PLATELET # BLD AUTO: 199 10E3/UL (ref 150–450)
POTASSIUM SERPL-SCNC: 4.1 MMOL/L (ref 3.4–5.3)
PROT SERPL-MCNC: 6.9 G/DL (ref 6.4–8.3)
RBC # BLD AUTO: 4.97 10E6/UL (ref 4.4–5.9)
RBC MORPH BLD: NORMAL
SODIUM SERPL-SCNC: 141 MMOL/L (ref 136–145)
WBC # BLD AUTO: 9.5 10E3/UL (ref 4–11)

## 2023-02-02 PROCEDURE — 99232 SBSQ HOSP IP/OBS MODERATE 35: CPT | Performed by: INTERNAL MEDICINE

## 2023-02-02 PROCEDURE — 70551 MRI BRAIN STEM W/O DYE: CPT

## 2023-02-02 PROCEDURE — 250N000013 HC RX MED GY IP 250 OP 250 PS 637: Performed by: INTERNAL MEDICINE

## 2023-02-02 PROCEDURE — 250N000013 HC RX MED GY IP 250 OP 250 PS 637: Performed by: PSYCHIATRY & NEUROLOGY

## 2023-02-02 PROCEDURE — 36415 COLL VENOUS BLD VENIPUNCTURE: CPT | Performed by: INTERNAL MEDICINE

## 2023-02-02 PROCEDURE — 258N000003 HC RX IP 258 OP 636: Performed by: INTERNAL MEDICINE

## 2023-02-02 PROCEDURE — 250N000011 HC RX IP 250 OP 636: Performed by: PSYCHIATRY & NEUROLOGY

## 2023-02-02 PROCEDURE — 120N000001 HC R&B MED SURG/OB

## 2023-02-02 PROCEDURE — 95816 EEG AWAKE AND DROWSY: CPT

## 2023-02-02 PROCEDURE — 85027 COMPLETE CBC AUTOMATED: CPT | Performed by: INTERNAL MEDICINE

## 2023-02-02 PROCEDURE — 80053 COMPREHEN METABOLIC PANEL: CPT | Performed by: INTERNAL MEDICINE

## 2023-02-02 RX ORDER — LEVETIRACETAM 10 MG/ML
1000 INJECTION INTRAVASCULAR ONCE
Status: COMPLETED | OUTPATIENT
Start: 2023-02-02 | End: 2023-02-02

## 2023-02-02 RX ADMIN — Medication 1 MG: at 20:53

## 2023-02-02 RX ADMIN — LEVETIRACETAM 1500 MG: 750 TABLET, FILM COATED ORAL at 08:39

## 2023-02-02 RX ADMIN — LEVOTHYROXINE SODIUM 137 MCG: 137 TABLET ORAL at 06:36

## 2023-02-02 RX ADMIN — ACETAMINOPHEN 650 MG: 325 TABLET, FILM COATED ORAL at 10:59

## 2023-02-02 RX ADMIN — LEVETIRACETAM 1000 MG: 10 INJECTION INTRAVENOUS at 13:49

## 2023-02-02 RX ADMIN — ACETAMINOPHEN 650 MG: 325 TABLET, FILM COATED ORAL at 00:57

## 2023-02-02 RX ADMIN — LEVETIRACETAM 1750 MG: 750 TABLET, FILM COATED ORAL at 20:53

## 2023-02-02 RX ADMIN — SODIUM CHLORIDE, POTASSIUM CHLORIDE, SODIUM LACTATE AND CALCIUM CHLORIDE: 600; 310; 30; 20 INJECTION, SOLUTION INTRAVENOUS at 08:56

## 2023-02-02 RX ADMIN — Medication 1 MG: at 00:57

## 2023-02-02 ASSESSMENT — ACTIVITIES OF DAILY LIVING (ADL)
ADLS_ACUITY_SCORE: 22
ADLS_ACUITY_SCORE: 26
ADLS_ACUITY_SCORE: 22

## 2023-02-02 NOTE — PLAN OF CARE
Pt here with seizures. Alert and oriented. Neuros intact, no deficits. VSS. Tele NSR, intermittent episodes of tachycardia throughout the night and morning. IDDSI level clear liquid diet with thin liquids. Takes pills whole. Up with SBA, calls appropriately. Continent of bowel and bladder. Reporting pain located in head and tongue. Pt scoring green on the Aggression Stop Light Tool. Plan for discharge pending.

## 2023-02-02 NOTE — PLAN OF CARE
VSS. Neuros intact. Tele NSR. Compliant with cares. No complaints of pain or discomfort at this time. SBA to the bathroom. LR infusing. Will continue to monitor.    /64 (BP Location: Right arm)   Pulse 71   Temp 98.6  F (37  C) (Oral)   Resp 16   SpO2 97%

## 2023-02-02 NOTE — PROVIDER NOTIFICATION
Text sent to hospitalist pt had intermittent tachycardia last noc per night nurse. would spike up to 140s/150s. this am VSS.

## 2023-02-02 NOTE — PROGRESS NOTES
[id number]    Dx: Recurrent Seizures    Neuro:  A&Ox 4. Continues on Seizure precautions, absent of any observed or reported seizures.     Tele/cardiac:   NSR    Respiratory:  LS: CTA. SpO2: 96% on RA.     Activity: SBA, Steady gait.     Pain:  C/O 4/10  Neck discomfort/ache/soreness. PRN Tylenol administered, warm pack applied, which was effective. Pt also c/o a sore tongue, which he had bitten during a seizure prior to admission.     IV:  PIV x1, LR at 100ml/hr.     Drains/tubes:  None    GI/:  Continent of bowel and bladder. Adequate urine output. Pt recals last BM om 1/31. Denies feelings of constipation    Skin:  Intact    Diet:  Clears, ADAT    Medication:  Takes whole w/ water    Aggression color:  green    Other:  Pt calm and pleasant towards writer and other staff. Receptive to all cares. PRN Melatonin administered to assist w/ sleep pattern.

## 2023-02-02 NOTE — PROGRESS NOTES
[facility]  Hospitalist Progress Note  Garrison Hall MD  02/02/2023    Assessment & Plan   Domo Simms is a 37 year old male admitted on 2/1/2023. He has hx of epilepsy dating back to childhood usually nocturnal seizures.  He had increased etoh intake over the weekend, possibly last 2 nights.  He was altered last night and this morning was not acting normal per wife.  He then had 3 episodes of generalized tonic-clonic seizures x3 at home and 1 episode en route via EMS to [facility] ED.    1. Recurrent Seizures  Hx of epilepsy  -- back to baseline  -- CTH nothing acute small retrocerebellar arachnoid cyst  -- loaded with keppra, continue home dosing   -- neurology consultation  -- keppra level 22.4 is therapeutic  -- viscous lidocaine for oral trauma  -- EEG today  -- consideration underway for second AED agent  -- discussed with neurology    2. NAG acidosis - resolved  -- likely secondary to lactic acidosis from multiple seizure episodes  -- SL IVF  -- check LA, CK elevated and likely improving    3. SANJUANITA  Hypernatremia  Hypercalemia  -- Cr 1.4 >> 1.09  -- SL IVF  -- monitor I/O  -- check UA negative Utox screen + amphetamine (patient on adderall)  -- repeat Ca normalized with hydration    4. Leukocytosis - resolved  --  stress related  -- no fever    5. ADD  -- hold adderall    6. Hx of HTN  -- not on any medications  -- monitor    Diet: Combination Diet Clear Liquid  Can advance as tolerated  DVT Prophylaxis: Pneumatic Compression Devices  Motta Catheter: Not present  Lines: None     Cardiac Monitoring: ACTIVE order. Indication: seizure  Code Status: Full Code      Clinically Significant Risk Factors         # Hypernatremia: Highest Na = 146 mmol/L in last 2 days, will monitor as appropriate   # Hypercalcemia: Highest Ca = 10.8 mg/dL in last 2 days, will monitor as appropriate   # Anion Gap Metabolic Acidosis: Highest Anion Gap = 38 mmol/L in last 2 days, will monitor and treat as  appropriate                     Disposition Plan      Expected Discharge Date: 02/03/2023               Interval History   -- back to baseline  -- no further seizures  -- discussed with neurology    -Data reviewed today: I reviewed all new labs and imaging over the last 24 hours. I personally reviewed no images or EKG's today.    Physical Exam    , Blood pressure 131/80, pulse 61, temperature 98.2  F (36.8  C), temperature source Oral, resp. rate 16, SpO2 98 %.  There were no vitals filed for this visit.  Vital Signs with Ranges  Temp:  [98.2  F (36.8  C)-98.6  F (37  C)] 98.2  F (36.8  C)  Pulse:  [61-71] 61  Resp:  [16] 16  BP: (119-131)/(64-80) 131/80  SpO2:  [96 %-98 %] 98 %  I/O's Last 24 hours  I/O last 3 completed shifts:  In: 1463.33 [P.O.:240; I.V.:1223.33]  Out: -     Constitutional: Awake, alert, cooperative, no apparent distress  Respiratory: Clear to auscultation bilaterally, no crackles or wheezing  Cardiovascular: Regular rate and rhythm, normal S1 and S2   GI: Normal bowel sounds, soft, non-distended, non-tender  Skin/Integumen: No rashes, no cyanosis, no edema  Other:      Medications   All medications were reviewed.      amphetamine-dextroamphetamine  10 mg Oral TID     levETIRAcetam  1,750 mg Oral BID     levothyroxine  137 mcg Oral QAM AC     sodium chloride (PF)  3 mL Intracatheter Q8H        Data   Recent Labs   Lab 02/02/23  0856 02/01/23  0848   WBC 9.5 16.9*   HGB 15.0 16.9   MCV 89 99    342    146*   POTASSIUM 4.1 3.8   CHLORIDE 107 101   CO2 25 7*   BUN 6.4 11.4   CR 1.09 1.40*   ANIONGAP 9 38*   BEBA 9.2 10.8*   GLC 89 239*   ALBUMIN 3.9 5.0   PROTTOTAL 6.9 8.4*   BILITOTAL 0.5 0.3   ALKPHOS 55 86   ALT 22 28   AST 33 27       No results found for this or any previous visit (from the past 24 hour(s)).    Garrison Hall MD  Text Page  (7am to 6pm)

## 2023-02-02 NOTE — PROVIDER NOTIFICATION
"Text sent to neuro team \"pt had an aura which he normally presents with prior to seziures. he has also been having tachycardic episodes since last night, his WA spiked to the 150s and he began to present with the aura like symptoms.\"    "

## 2023-02-03 ENCOUNTER — HOSPITAL ENCOUNTER (INPATIENT)
Dept: NEUROLOGY | Facility: CLINIC | Age: 38
Discharge: HOME | DRG: 101 | End: 2023-02-03
Attending: PSYCHIATRY & NEUROLOGY
Payer: COMMERCIAL

## 2023-02-03 PROCEDURE — 120N000001 HC R&B MED SURG/OB

## 2023-02-03 PROCEDURE — 95714 VEEG EA 12-26 HR UNMNTR: CPT

## 2023-02-03 PROCEDURE — 250N000013 HC RX MED GY IP 250 OP 250 PS 637: Performed by: PSYCHIATRY & NEUROLOGY

## 2023-02-03 PROCEDURE — 250N000013 HC RX MED GY IP 250 OP 250 PS 637: Performed by: INTERNAL MEDICINE

## 2023-02-03 PROCEDURE — 99232 SBSQ HOSP IP/OBS MODERATE 35: CPT | Performed by: INTERNAL MEDICINE

## 2023-02-03 RX ADMIN — LEVOTHYROXINE SODIUM 137 MCG: 137 TABLET ORAL at 08:26

## 2023-02-03 RX ADMIN — LEVETIRACETAM 1750 MG: 750 TABLET, FILM COATED ORAL at 08:26

## 2023-02-03 RX ADMIN — ACETAMINOPHEN 650 MG: 325 TABLET, FILM COATED ORAL at 12:59

## 2023-02-03 RX ADMIN — LEVETIRACETAM 1750 MG: 750 TABLET, FILM COATED ORAL at 20:27

## 2023-02-03 RX ADMIN — ACETAMINOPHEN 650 MG: 325 TABLET, FILM COATED ORAL at 06:04

## 2023-02-03 ASSESSMENT — ACTIVITIES OF DAILY LIVING (ADL)
ADLS_ACUITY_SCORE: 22

## 2023-02-03 NOTE — PROGRESS NOTES
[facility]  Hospitalist Progress Note  Garrison Hall MD  02/03/2023    Assessment & Plan   Domo Simms is a 37 year old male admitted on 2/1/2023. He has hx of epilepsy dating back to childhood usually nocturnal seizures.  He had increased etoh intake over the weekend, possibly last 2 nights.  He was altered last night and this morning was not acting normal per wife.  He then had 3 episodes of generalized tonic-clonic seizures x3 at home and 1 episode en route via EMS to [facility] ED.    1. Recurrent Seizures  Hx of epilepsy  -- back to baseline  -- CTH nothing acute small retrocerebellar arachnoid cyst  -- loaded with keppra, continue home dosing   -- neurology consultation  -- keppra level 22.4 is therapeutic  -- viscous lidocaine for oral trauma  -- EEG with mild encephalopathy  -- keppra dose increased from 1500 to 1750 mg bid today  -- patient had 2 spells today when he was looking for things and was not very verbal, patient now getting continuous VEEG    2. NAG acidosis - resolved  -- likely secondary to lactic acidosis from multiple seizure episodes  -- SL IVF  -- LA, CK elevated and likely improving    3. SANJUANITA  Hypernatremia  Hypercalemia  -- Cr 1.4 >> 1.09  -- SL IVF  -- monitor I/O  -- check UA negative Utox screen + amphetamine (patient on adderall)  -- repeat Ca normalized with hydration    4. Leukocytosis - resolved  --  stress related  -- no fever    5. ADD  -- hold adderall    6. Hx of HTN  -- not on any medications  -- family concerned about nocturnal blood pressures that are elevated  -- BP readings reviewed.    Diet: regular diet  DVT Prophylaxis: Pneumatic Compression Devices  Motta Catheter: Not present  Lines: None     Cardiac Monitoring: ACTIVE order. Indication: seizure  Code Status: Full Code      Clinically Significant Risk Factors         # Hypernatremia: Highest Na = 146 mmol/L in last 2 days, will monitor as appropriate   # Hypercalcemia: Highest Ca = 10.8  "mg/dL in last 2 days, will monitor as appropriate   # Anion Gap Metabolic Acidosis: Highest Anion Gap = 38 mmol/L in last 2 days, will monitor and treat as appropriate                     Disposition Plan      Expected Discharge Date: 02/04/2023               Interval History   -- he had 2 spells this morning 30 minutes apart  -- mother at bedside  -- concern about sleep and elevated nocturnal bp    -Data reviewed today: I reviewed all new labs and imaging over the last 24 hours. I personally reviewed no images or EKG's today.    Physical Exam    , Blood pressure 124/76, pulse 72, temperature 98.7  F (37.1  C), temperature source Oral, resp. rate 16, height 1.803 m (5' 10.98\"), weight 95.1 kg (209 lb 10.5 oz), SpO2 97 %.  Vitals:    02/03/23 1117   Weight: 95.1 kg (209 lb 10.5 oz)     Vital Signs with Ranges  Temp:  [97.6  F (36.4  C)-98.7  F (37.1  C)] 98.7  F (37.1  C)  Pulse:  [58-72] 72  Resp:  [16-18] 16  BP: (121-131)/(76-89) 124/76  SpO2:  [96 %-98 %] 97 %  I/O's Last 24 hours  No intake/output data recorded.    Constitutional: Awake, alert, cooperative, no apparent distress  Respiratory: Clear to auscultation bilaterally, no crackles or wheezing  Cardiovascular: Regular rate and rhythm, normal S1 and S2   GI: Normal bowel sounds, soft, non-distended, non-tender  Skin/Integumen: No rashes, no cyanosis, no edema  Other:      Medications   All medications were reviewed.      amphetamine-dextroamphetamine  10 mg Oral TID     levETIRAcetam  1,750 mg Oral BID     levothyroxine  137 mcg Oral QAM AC     sodium chloride (PF)  3 mL Intracatheter Q8H        Data   Recent Labs   Lab 02/02/23  0856 02/01/23  0848   WBC 9.5 16.9*   HGB 15.0 16.9   MCV 89 99    342    146*   POTASSIUM 4.1 3.8   CHLORIDE 107 101   CO2 25 7*   BUN 6.4 11.4   CR 1.09 1.40*   ANIONGAP 9 38*   BEBA 9.2 10.8*   GLC 89 239*   ALBUMIN 3.9 5.0   PROTTOTAL 6.9 8.4*   BILITOTAL 0.5 0.3   ALKPHOS 55 86   ALT 22 28   AST 33 27       Recent " Results (from the past 24 hour(s))   MR Brain w/o Contrast    Narrative    EXAM: MR BRAIN W/O CONTRAST  LOCATION: [facility]  DATE/TIME: 2/2/2023 5:37 PM    INDICATION: recurrent seizure  COMPARISON: CT head 02/01/2023  TECHNIQUE: 1.5 Lilia multiplanar multisequence head MRI without intravenous contrast according to the seizure protocol.    FINDINGS:  INTRACRANIAL CONTENTS: Dedicated imaging of the temporal lobes demonstrates symmetrical size and signal intensity of the mesial temporal structures including the hippocampus. No malformations of cortical development. No acute or subacute infarct. No   mass, acute hemorrhage, or extra-axial fluid collections. There is a single punctate focus of subcortical white matter FLAIR hyperintensity at the posterior right frontal lobe. Otherwise normal signal intensity of the brain parenchyma. Normal ventricles   and sulci.  Normal position of the cerebellar tonsils.     SELLA: No abnormality accounting for technique.    OSSEOUS STRUCTURES/SOFT TISSUES: Normal marrow signal. The major intracranial vascular flow voids are maintained.     ORBITS: No abnormality accounting for technique.     SINUSES/MASTOIDS: No paranasal sinus mucosal disease. No middle ear or mastoid effusion.       Impression    IMPRESSION:  1.  No epileptogenic focus identified.  2.  No acute intracranial process.  3.  Single nonspecific punctate focus of FLAIR hyperintensity at the right frontal lobe. Differential considerations would include vascular migraines, vasculitis, prior infection, trauma, or ischemia.       Garrison Hall MD  Text Page  (7am to 6pm)

## 2023-02-03 NOTE — PLAN OF CARE
Reason for Admission: seizures    Cognitive/Mentation: A/Ox 4  Neuros/CMS: Intact   VS: stable on RA.   Tele: SR.  GI: BS active, + flatus. Continent.  : voiding. Continent.  Pulmonary: LS clear.  Pain: mild. Gave tylenol with relief.     Drains/Lines: PIV  Skin: intact  Activity: Assist x SB with GB.  Diet: Regular with thin liquids. Takes pills whole.     Therapies recs: home  Discharge: pending w/u    Aggression Stoplight Tool: green    End of shift summary: Patient had episode of confusion at 0810, lasting one minute then resolved. MD aware. Similar episodes occurred at 1200 & 1225. MD aware. Continuous EEG started. Manager approved one family member staying overnight. No other episodes for the rest of the shift.

## 2023-02-03 NOTE — PROGRESS NOTES
ASSESSMENT/PLAN    Domo Simms presented with known nocturnal seizures presenting with multiple breakthrough events . He does reports might have missed one dose of Keppra.     Levels of keppra were therapeutic  Had a  Confusional aura this AM brief  EEG suggestive of mild encephalopathy  Abnormal CT head with retro cerebellar cyst    Mild headaches secondary to above       PLAN    Increase Keppra 1750 mg BID  MR brain   Therapies   Abstain alcohol use   Will continue to follow     Altagracia Camacho MD  [facility]  Office Phone [phone]      CLINICAL PROBLEMS:    Known history of childhood epilepsy  Abnormal CT of the head with retrocerebellar cyst      24 HOUR EVENTS:      Had a aura feeling acting confused which happened prior to seizure- Received IV Keppra 1 gm   Mild headache behind eyes    EXAMINATION:   Past medical history, family history, social history and review of systems are unchanged except as noted below.    VITAL SIGNS:   /80   Pulse 61   Temp 98.2  F (36.8  C) (Oral)   Resp 16   SpO2 98%       No acute distress, Alert and oriented to self, pupils equal and round visual.  Full extraocular movements are intact face is symmetric and normal to conversation  Able to move all 4 limbs against gravity flexes normal and symmetrical no tremors or involuntary movements.        PERTINENT DATA:  Lab and X-ray: Recent Labs   Lab Test 02/02/23  0856   WBC 9.5   HGB 15.0      POTASSIUM 4.1     [unfilled]  Recent Labs   Lab Test 02/02/23  0856 02/01/23  0848   POTASSIUM 4.1 3.8   CHLORIDE 107 101   BUN 6.4 11.4     Recent Labs   Lab Test 02/02/23  0856 02/01/23  0848   WBC 9.5 16.9*   HGB 15.0 16.9   MCV 89 99    342     Recent Labs   Lab Test 02/02/23  0856 02/01/23  0848   AST 33 27   ALT 22 28   ALKPHOS 55 86     No lab results found.    Invalid input(s): TRIGLYCERIDES  No lab results found.    Laboratory results were personally interpreted and reviewed in  detail.    Imaging studies reviewed and interpreted in detail    Summary: List Problems:   Patient Active Problem List   Diagnosis     Recurrent seizures (H)

## 2023-02-03 NOTE — PROGRESS NOTES
At 0810 this RN responded to bed alarm in patient's room. Pt found standing at bedside. Did not respond to questions for approx. 1 minute. Pt assisted back to bed. Pt then starting talking and was oriented x4. Pt's mother at bedside - requested that scheduled antiepileptic medication be administered. Bedside RN updated. Bed alarm reset.

## 2023-02-03 NOTE — PLAN OF CARE
Pt here with seizures. A&Ox4. Neuros intact. VSS. Tele NSR. Clear diet, thin liquids. Takes pills whole. Advance to regular as tolerated. Pt has not had much of appetite.  Up with SBA. HA pain managed with tylenol. Pt scoring green on the Aggression Stop Light Tool. 0400  writer entered the room. Pt appeared to be in no distress. Soon after set off bed alarm. Writer entered the room where the Pt was very confused looking for his child. Ambulated to the bathroom, and returned to bed AO soon after. Continent. Discharge pending.

## 2023-02-03 NOTE — PLAN OF CARE
Reason for Admission: recurrent seizures     Cognitive/Mentation: A&O x4  Neuros/CMS: Intact   VS: Stable on RA   GI: BS active, + flatus. Continent.  : Voiding. Continent.  Pulmonary: LS clear.  Pain: HA 3/10     Drains/Lines: PIV SL  Skin: Intact  Activity: SBA  Diet:  Clear, thin liquids. Takes pills whole.      Discharge: 2/3     Aggression Stoplight Tool: Green     End of shift summary: MRI and EEG completed. No acute changes. No seizure activity this shift.

## 2023-02-03 NOTE — PROGRESS NOTES
"  ASSESSMENT/PLAN    Domo Simms presented with known nocturnal seizures presenting with multiple breakthrough events . He does reports might have missed one dose of Keppra. Other contributing factors- Alcohol use/daily marijuana user      PLAN    Keppra 1750 mg BID, check keppra levels in 1 week  Abstain alcohol use / Marijuana use   No driving   Follow up with outpatient neurology 4 weeks         ADDENDUM      -- Family witnessed spells which were brief lasting for couple minutes where patient has been searching for things    Likely above-mentioned mentioned with the spells can be related to anxiety versus residual postictal effect for multiple seizures prior to the arrival    Other considerations being a continuous video EEG to capture the targeted spells for evaluation family is agreeable to the plan.    CVEEG today      Altagracia Camacho MD  [facility]  Office Phone [phone]      CLINICAL PROBLEMS:    Known history of childhood epilepsy  Abnormal CT of the head with retrocerebellar cyst  Daily marijuana use      24 HOUR EVENTS:      No recurrent spells seizure like activity    Patient being anxious last night    Reviewed MRI brain findings with the patient and wife at the bedside nonspecific single white matter hyperintensity -no epileptogenic focus    EEG showed mild slowing, no electrographic seizures.      EXAMINATION:   Past medical history, family history, social history and review of systems are unchanged except as noted below.    VITAL SIGNS:   /76 (BP Location: Right arm)   Pulse 72   Temp 98.7  F (37.1  C) (Oral)   Resp 16   Ht 1.803 m (5' 10.98\")   Wt 95.1 kg (209 lb 10.5 oz)   SpO2 97%   BMI 29.25 kg/m        No acute distress, Alert and oriented to self, pupils equal and round visual.  Full extraocular movements are intact face is symmetric and normal to conversation  Able to move all 4 limbs against gravity no tremors or involuntary movements.        PERTINENT DATA:  Lab and " X-ray: Recent Labs   Lab Test 02/02/23  0856   WBC 9.5   HGB 15.0      POTASSIUM 4.1     [unfilled]  Recent Labs   Lab Test 02/02/23  0856 02/01/23  0848   POTASSIUM 4.1 3.8   CHLORIDE 107 101   BUN 6.4 11.4     Recent Labs   Lab Test 02/02/23  0856 02/01/23  0848   WBC 9.5 16.9*   HGB 15.0 16.9   MCV 89 99    342     Recent Labs   Lab Test 02/02/23  0856 02/01/23  0848   AST 33 27   ALT 22 28   ALKPHOS 55 86     No lab results found.    Invalid input(s): TRIGLYCERIDES  No lab results found.    Laboratory results were personally interpreted and reviewed in detail.    Imaging studies reviewed and interpreted in detail    Summary: List Problems:   Patient Active Problem List   Diagnosis     Recurrent seizures (H)

## 2023-02-04 VITALS
SYSTOLIC BLOOD PRESSURE: 132 MMHG | BODY MASS INDEX: 29.35 KG/M2 | HEART RATE: 75 BPM | TEMPERATURE: 98.2 F | RESPIRATION RATE: 16 BRPM | HEIGHT: 71 IN | DIASTOLIC BLOOD PRESSURE: 81 MMHG | WEIGHT: 209.66 LBS | OXYGEN SATURATION: 97 %

## 2023-02-04 LAB
ANION GAP SERPL CALCULATED.3IONS-SCNC: 10 MMOL/L (ref 7–15)
BUN SERPL-MCNC: 8.7 MG/DL (ref 6–20)
CALCIUM SERPL-MCNC: 9.9 MG/DL (ref 8.6–10)
CHLORIDE SERPL-SCNC: 102 MMOL/L (ref 98–107)
CK SERPL-CCNC: 870 U/L (ref 39–308)
CREAT SERPL-MCNC: 0.99 MG/DL (ref 0.67–1.17)
DEPRECATED HCO3 PLAS-SCNC: 28 MMOL/L (ref 22–29)
GFR SERPL CREATININE-BSD FRML MDRD: >90 ML/MIN/1.73M2
GLUCOSE SERPL-MCNC: 139 MG/DL (ref 70–99)
LACTATE SERPL-SCNC: 1.3 MMOL/L (ref 0.7–2)
POTASSIUM SERPL-SCNC: 3.9 MMOL/L (ref 3.4–5.3)
SODIUM SERPL-SCNC: 140 MMOL/L (ref 136–145)

## 2023-02-04 PROCEDURE — 83605 ASSAY OF LACTIC ACID: CPT | Performed by: INTERNAL MEDICINE

## 2023-02-04 PROCEDURE — 36415 COLL VENOUS BLD VENIPUNCTURE: CPT | Performed by: INTERNAL MEDICINE

## 2023-02-04 PROCEDURE — 82550 ASSAY OF CK (CPK): CPT | Performed by: INTERNAL MEDICINE

## 2023-02-04 PROCEDURE — 250N000013 HC RX MED GY IP 250 OP 250 PS 637: Performed by: PSYCHIATRY & NEUROLOGY

## 2023-02-04 PROCEDURE — 80048 BASIC METABOLIC PNL TOTAL CA: CPT | Performed by: INTERNAL MEDICINE

## 2023-02-04 PROCEDURE — 250N000013 HC RX MED GY IP 250 OP 250 PS 637: Performed by: INTERNAL MEDICINE

## 2023-02-04 PROCEDURE — 99239 HOSP IP/OBS DSCHRG MGMT >30: CPT | Performed by: INTERNAL MEDICINE

## 2023-02-04 RX ORDER — LEVETIRACETAM 250 MG/1
1750 TABLET ORAL 2 TIMES DAILY
Qty: 420 TABLET | Refills: 0 | Status: SHIPPED | OUTPATIENT
Start: 2023-02-04 | End: 2023-03-06

## 2023-02-04 RX ADMIN — LEVOTHYROXINE SODIUM 137 MCG: 137 TABLET ORAL at 09:02

## 2023-02-04 RX ADMIN — Medication 1 MG: at 00:49

## 2023-02-04 RX ADMIN — LEVETIRACETAM 1750 MG: 750 TABLET, FILM COATED ORAL at 09:02

## 2023-02-04 ASSESSMENT — ACTIVITIES OF DAILY LIVING (ADL)
ADLS_ACUITY_SCORE: 22

## 2023-02-04 NOTE — PLAN OF CARE
Reason for Admission: seizures     Cognitive/Mentation: A/Ox 4  Neuros/CMS: Intact   VS: stable on RA.   Tele: SR.  GI: BS active, + flatus. Continent.  : voiding. Continent.  Pulmonary: LS clear.  Pain: mild. Gave tylenol with relief.      Drains/Lines: PIV  Skin: intact  Activity: Assist x SB with GB.  Diet: Regular with thin liquids. Takes pills whole.      Therapies recs: home  Discharge: 2/4     Aggression Stoplight Tool: green     End of shift summary: Patient discharged home via family. Belongings checked and sent with patient.

## 2023-02-04 NOTE — DISCHARGE SUMMARY
[facility]  Discharge Summary        Domo Simms MRN# [id number]   YOB: 1985 Age: 37 year old     Date of Admission:  2/1/2023  Date of Discharge:  2/4/2023  Admitting Physician:  Garrison Hall MD  Discharge Physician: Garrison Hall MD  Discharging Service: Hospitalist     Primary Provider:  Yesica Pulido  Primary Care Physician Phone Number: None         Discharge Diagnoses/Problem Oriented Hospital Course (Providers):    Domo Simms was admitted on 2/1/2023 by Garrison Hall MD and I would refer you to their history and physical.  The following problems were addressed during his hospitalization:       Domo Simms is a 37 year old male admitted on 2/1/2023. He has hx of epilepsy dating back to childhood usually nocturnal seizures.  He had increased etoh intake over the weekend, possibly last 2 nights.  He was altered last night and this morning was not acting normal per wife.  He then had 3 episodes of generalized tonic-clonic seizures x3 at home and 1 episode en route via EMS to [facility] ED.     1. Recurrent Seizures  Hx of epilepsy  -- back to baseline  -- CTH nothing acute small retrocerebellar arachnoid cyst  -- loaded with keppra, continue home dosing   -- neurology consultation  -- keppra level 22.4 is therapeutic  -- viscous lidocaine for oral trauma  -- EEG with mild encephalopathy  -- keppra dose increased from 1500 to 1750 mg bid    -- patient had 2 spells today when he was looking for things and was not very verbal, patient now getting continuous VEEG   -- vEEG did not show any abnormal activity, although he did not have any further spells while on continuous vEEG  -- check keppra level in 1 week     2. NAG acidosis - resolved  -- likely secondary to lactic acidosis from multiple seizure episodes  -- SL IVF  -- LA, CK elevated and likely improving     3. SANJUANITA  Hypernatremia  Hypercalemia  -- Cr 1.4 >> 1.09  --  IVF  -- monitor I/O  -- check  UA negative Utox screen + amphetamine (patient on adderall)  -- repeat Ca normalized with hydration     4. Leukocytosis - resolved  --  stress related  -- no fever     5. ADD  -- hold adderall     6. Hx of HTN  -- not on any medications  -- family concerned about nocturnal blood pressures that are elevated  -- BP readings reviewed.       Clinically Significant Risk Factors          # Hypernatremia: Highest Na = 146 mmol/L in last 2 days, will monitor as appropriate   # Hypercalcemia: Highest Ca = 10.8 mg/dL in last 2 days, will monitor as appropriate   # Anion Gap Metabolic Acidosis: Highest Anion Gap = 38 mmol/L in last 2 days, will monitor and treat as appropriate                      Disposition Plan      Expected Discharge Date: 02/04/2023                    Code Status:      Full Code         Important Results:      See below         Pending Results:        Unresulted Labs Ordered in the Past 30 Days of this Admission     No orders found from 1/2/2023 to 2/2/2023.               Discharge Instructions and Follow-Up:      Follow-up Appointments     Follow-up and recommended labs and tests       Follow up with Dr. Camacho in 3-4 weeks with repeat keppra levels    No driving for 3 months         Follow-up and recommended labs and tests       Follow up with neurology in 2-3 weeks.    Outpatient follow up with neurologist who specializes in epilepsy                  Discharge Disposition:      Discharged to home         Discharge Medications:        Current Discharge Medication List      CONTINUE these medications which have CHANGED    Details   levETIRAcetam (KEPPRA) 250 MG tablet Take 7 tablets (1,750 mg) by mouth 2 times daily for 30 days  Qty: 420 tablet, Refills: 0    Associated Diagnoses: Recurrent seizures (H)         CONTINUE these medications which have NOT CHANGED    Details   amphetamine-dextroamphetamine (ADDERALL) 10 MG tablet Take 10 mg by mouth 3 times daily      Calcium Polycarbophil (FIBER) 625 MG  tablet Take by mouth daily      Cholecalciferol (VITAMIN D3) 10 MCG/ML LIQD Take by mouth daily      ibuprofen (ADVIL/MOTRIN) 200 MG capsule Take 200 mg by mouth every 6 hours as needed for fever      levothyroxine (SYNTHROID/LEVOTHROID) 137 MCG tablet Take 137 mcg by mouth daily                  Allergies:       No Known Allergies         Consultations This Hospital Stay:      Consultation during this admission received from neurology          Discharge Orders       After Care Instructions     Activity      Your activity upon discharge: activity as tolerated         Activity      Your activity upon discharge: no driving for 12 weeks as per [place] State law         Diet      Follow this diet upon discharge: Orders Placed This Encounter      Combination Diet Regular Diet         Diet      Follow this diet upon discharge: Orders Placed This Encounter      Combination Diet Regular Diet      Diet                    Discharge Time:       Greater than 30 minutes.        Image Results From This Hospital Stay (For Non-EPIC Providers):        Results for orders placed or performed during the hospital encounter of 02/01/23   Head CT w/o contrast    Narrative    CT SCAN OF THE HEAD WITHOUT CONTRAST   2/1/2023 9:09 AM     HISTORY: Multiple seizures,     TECHNIQUE:  Axial images of the head and coronal reformations without  IV contrast material. Radiation dose for this scan was reduced using  automated exposure control, adjustment of the mA and/or kV according  to patient size, or iterative reconstruction technique.    COMPARISON: None.    FINDINGS: There is no evidence of intracranial hemorrhage, mass, acute  infarct or anomaly. The ventricles are normal in size, shape and  configuration. The brain parenchyma and subarachnoid spaces are  normal. Gray matter/white matter differentiation within normal levels.  Small retrocerebellar arachnoid cyst incidentally noted.    The visualized portions of the sinuses and mastoids appear  normal. The  bony calvarium and bones of the skull base appear intact.       Impression    IMPRESSION:   No evidence of acute intracranial hemorrhage, mass, or  herniation.      KRISTY FERNANDEZ MD         SYSTEM ID:  [id number]   MR Brain w/o Contrast    Narrative    EXAM: MR BRAIN W/O CONTRAST  LOCATION: [facility]  DATE/TIME: 2/2/2023 5:37 PM    INDICATION: recurrent seizure  COMPARISON: CT head 02/01/2023  TECHNIQUE: 1.5 Lilia multiplanar multisequence head MRI without intravenous contrast according to the seizure protocol.    FINDINGS:  INTRACRANIAL CONTENTS: Dedicated imaging of the temporal lobes demonstrates symmetrical size and signal intensity of the mesial temporal structures including the hippocampus. No malformations of cortical development. No acute or subacute infarct. No   mass, acute hemorrhage, or extra-axial fluid collections. There is a single punctate focus of subcortical white matter FLAIR hyperintensity at the posterior right frontal lobe. Otherwise normal signal intensity of the brain parenchyma. Normal ventricles   and sulci.  Normal position of the cerebellar tonsils.     SELLA: No abnormality accounting for technique.    OSSEOUS STRUCTURES/SOFT TISSUES: Normal marrow signal. The major intracranial vascular flow voids are maintained.     ORBITS: No abnormality accounting for technique.     SINUSES/MASTOIDS: No paranasal sinus mucosal disease. No middle ear or mastoid effusion.       Impression    IMPRESSION:  1.  No epileptogenic focus identified.  2.  No acute intracranial process.  3.  Single nonspecific punctate focus of FLAIR hyperintensity at the right frontal lobe. Differential considerations would include vascular migraines, vasculitis, prior infection, trauma, or ischemia.           Most Recent Lab Results In EPIC (For Non-EPIC Providers):    Most Recent 3 CBC's:  Recent Labs   Lab Test 02/02/23  0856 02/01/23  0848   WBC 9.5 16.9*   HGB 15.0 16.9   MCV  89 99    342      Most Recent 3 BMP's:  Recent Labs   Lab Test 02/04/23  0812 02/02/23  0856 02/01/23  0848    141 146*   POTASSIUM 3.9 4.1 3.8   CHLORIDE 102 107 101   CO2 28 25 7*   BUN 8.7 6.4 11.4   CR 0.99 1.09 1.40*   ANIONGAP 10 9 38*   BEBA 9.9 9.2 10.8*   * 89 239*     Most Recent 3 Troponin's:No lab results found.    Invalid input(s): TROP, TROPONINIES  Most Recent 3 INR's:No lab results found.  Most Recent 2 LFT's:  Recent Labs   Lab Test 02/02/23  0856 02/01/23  0848   AST 33 27   ALT 22 28   ALKPHOS 55 86   BILITOTAL 0.5 0.3     Most Recent Cholesterol Panel:No lab results found.  Most Recent 6 Bacteria Isolates From Any Culture (See EPIC Reports for Culture Details):No lab results found.  Most Recent TSH, T4 and HgbA1c:No lab results found.

## 2023-02-04 NOTE — PLAN OF CARE
[phone]  Neuro assessments intact: A&Ox4, PERRL, denies pain/discomfort. Continuous EEG in place. Slept well overnight with PRN melatonin.   VSS on RA. HR 50's-60's, SB while at rest. Continent of urine/stool. Ambulating to BR with SBA. Father at bedside.   Patient requesting to resume Adderall this AM as this is a baseline Rx.  Aurelia Viramontes RN

## 2023-02-04 NOTE — DISCHARGE INSTRUCTIONS
Follow up with Dr. Camacho at the [facility] ([phone]). Tell them you were recently hospitalized and need a follow up with Dr. Camacho.

## 2023-09-17 ENCOUNTER — HEALTH MAINTENANCE LETTER (OUTPATIENT)
Age: 38
End: 2023-09-17

## 2024-11-10 ENCOUNTER — HEALTH MAINTENANCE LETTER (OUTPATIENT)
Age: 39
End: 2024-11-10